# Patient Record
Sex: FEMALE | Race: WHITE
[De-identification: names, ages, dates, MRNs, and addresses within clinical notes are randomized per-mention and may not be internally consistent; named-entity substitution may affect disease eponyms.]

---

## 2017-10-14 ENCOUNTER — HOSPITAL ENCOUNTER (EMERGENCY)
Dept: HOSPITAL 31 - C.ER | Age: 67
Discharge: HOME | End: 2017-10-14
Payer: MEDICARE

## 2017-10-14 VITALS — BODY MASS INDEX: 37.7 KG/M2

## 2017-10-14 VITALS — TEMPERATURE: 98.2 F | OXYGEN SATURATION: 99 %

## 2017-10-14 VITALS — RESPIRATION RATE: 20 BRPM | HEART RATE: 63 BPM | DIASTOLIC BLOOD PRESSURE: 75 MMHG | SYSTOLIC BLOOD PRESSURE: 135 MMHG

## 2017-10-14 DIAGNOSIS — J40: Primary | ICD-10-CM

## 2017-10-14 LAB
ALBUMIN/GLOB SERPL: 0.9 {RATIO} (ref 1–2.1)
ALP SERPL-CCNC: 123 U/L (ref 38–126)
ALT SERPL-CCNC: 41 U/L (ref 9–52)
AST SERPL-CCNC: 24 U/L (ref 14–36)
BASOPHILS # BLD AUTO: 0.1 K/UL (ref 0–0.2)
BASOPHILS NFR BLD: 0.7 % (ref 0–2)
BILIRUB SERPL-MCNC: 0.4 MG/DL (ref 0.2–1.3)
BUN SERPL-MCNC: 18 MG/DL (ref 7–17)
CALCIUM SERPL-MCNC: 10 MG/DL (ref 8.6–10.4)
CHLORIDE SERPL-SCNC: 102 MMOL/L (ref 98–107)
CO2 SERPL-SCNC: 26 MMOL/L (ref 22–30)
EOSINOPHIL # BLD AUTO: 0.6 K/UL (ref 0–0.7)
EOSINOPHIL NFR BLD: 5.8 % (ref 0–4)
ERYTHROCYTE [DISTWIDTH] IN BLOOD BY AUTOMATED COUNT: 14.8 % (ref 11.5–14.5)
GLOBULIN SER-MCNC: 4.5 GM/DL (ref 2.2–3.9)
GLUCOSE SERPL-MCNC: 108 MG/DL (ref 65–105)
HCT VFR BLD CALC: 41.6 % (ref 34–47)
LYMPHOCYTES # BLD AUTO: 4.9 K/UL (ref 1–4.3)
LYMPHOCYTES NFR BLD AUTO: 45 % (ref 20–40)
MCH RBC QN AUTO: 25.6 PG (ref 27–31)
MCHC RBC AUTO-ENTMCNC: 32.3 G/DL (ref 33–37)
MCV RBC AUTO: 79.1 FL (ref 81–99)
MONOCYTES # BLD: 0.8 K/UL (ref 0–0.8)
MONOCYTES NFR BLD: 7.7 % (ref 0–10)
NRBC BLD AUTO-RTO: 0 % (ref 0–2)
PLATELET # BLD: 297 K/UL (ref 130–400)
PMV BLD AUTO: 9 FL (ref 7.2–11.7)
POTASSIUM SERPL-SCNC: 3.8 MMOL/L (ref 3.6–5.2)
PROT SERPL-MCNC: 8.7 G/DL (ref 6.3–8.3)
SODIUM SERPL-SCNC: 139 MMOL/L (ref 132–148)
WBC # BLD AUTO: 10.9 K/UL (ref 4.8–10.8)

## 2017-10-14 PROCEDURE — 83880 ASSAY OF NATRIURETIC PEPTIDE: CPT

## 2017-10-14 PROCEDURE — 84484 ASSAY OF TROPONIN QUANT: CPT

## 2017-10-14 PROCEDURE — 80053 COMPREHEN METABOLIC PANEL: CPT

## 2017-10-14 PROCEDURE — 71020: CPT

## 2017-10-14 PROCEDURE — 94150 VITAL CAPACITY TEST: CPT

## 2017-10-14 PROCEDURE — 96360 HYDRATION IV INFUSION INIT: CPT

## 2017-10-14 PROCEDURE — 94640 AIRWAY INHALATION TREATMENT: CPT

## 2017-10-14 PROCEDURE — 99284 EMERGENCY DEPT VISIT MOD MDM: CPT

## 2017-10-14 PROCEDURE — 93005 ELECTROCARDIOGRAM TRACING: CPT

## 2017-10-14 PROCEDURE — 85025 COMPLETE CBC W/AUTO DIFF WBC: CPT

## 2017-10-14 NOTE — C.PDOC
History Of Present Illness


67 year old female presents to the ED for evaluation of shortness of breath. 

Patient notes that she had a cold 4-5 weeks ago. Today began having shortness 

of breath, both at rest and with exertion. She endorses chest tightness, 

congestion, and wheezing. She denies fever or cough. No history of asthma or 

COPD.


Time Seen by Provider: 10/14/17 15:11


Chief Complaint (Nursing): Shortness Of Breath


History Per: Patient


History/Exam Limitations: no limitations


Current Symptoms Are (Timing): Still Present


Associated Symptoms: denies: Fever, Cough





Past Medical History


Reviewed: Historical Data, Nursing Documentation, Vital Signs


Vital Signs: 


 Last Vital Signs











Temp  98.2 F   10/14/17 15:05


 


Pulse  63   10/14/17 17:56


 


Resp  20   10/14/17 17:56


 


BP  135/75   10/14/17 17:56


 


Pulse Ox  99   10/14/17 18:01














- Medical History


PMH: Arthritis, HTN, Hypercholesterolemia


   Denies: Asthma


Family History: States: Unknown Family Hx





- Social History


Hx Tobacco Use: No


Hx Alcohol Use: No


Hx Substance Use: No





- Immunization History


Hx Tetanus Toxoid Vaccination: No


Hx Influenza Vaccination: Yes


Hx Pneumococcal Vaccination: No





Physical Exam





- Physical Exam


Appears: Non-toxic, No Acute Distress


Skin: Normal Color, Warm, Dry


Head: Atraumatic, Normacephalic


Eye(s): bilateral: Normal Inspection, PERRL, EOMI


Oral Mucosa: Moist


Respiratory: Normal Breath Sounds, No Rales, No Rhonchi, No Wheezing, Other (

Speaking in full sentences)


Extremity: Normal ROM, No Pedal Edema, No Deformity


Neurological/Psych: Oriented x3, Normal Speech





ED Course And Treatment





- Laboratory Results


Result Diagrams: 


 10/14/17 17:11





 10/14/17 17:11


ECG: Interpreted By Me, Viewed By Me


ECG Rhythm: Sinus Rhythm


Interpretation Of ECG: T-wave inversions in leads I, aVL, V2, and V6, EKG 

unchanged from prior on 2016


Rate From EC


O2 Sat by Pulse Oximetry: 99 (on room air)





- Radiology


CXR: Interpreted by Me, Viewed By Me, Read By Radiologist


CXR Interpretation: Yes: No Acute Disease





Progress





- Re-Evaluation


Re-evaluation Note: 





10/14/17 16:23


SP NEB . CO MILD IMPROVE. PERSIST SOB. VSS LABS PENDING


10/14/17 18:00


NARD VSS FEELS BETTER





- Data Reviewed


Data Reviewed: Lab, Diagnostic imaging, EKG, Old records





Disposition


Counseled Patient/Family Regarding: Studies Performed, Diagnosis, Need For 

Followup, Rx Given





- Disposition


Referrals: 


[facility] [Outside]


[facility] [Outside]


Disposition: HOME/ ROUTINE


Disposition Time: 18:00


Condition: IMPROVED


Prescriptions: 


Albuterol HFA [Ventolin HFA 90 mcg/actuation (8 g)] 1 puff IH Q4 #1 inhaler


Benzonatate [Tessalon Perles] 200 mg PO TID PRN #15 sgl


 PRN Reason: Cough


Instructions:  Acute Bronchitis (ED)


Forms:  [facility] (English)





- Clinical Impression


Clinical Impression: 


 Bronchitis








- Scribe Statement


The provider has reviewed the documentation as recorded by the Lorrie Alexis


Provider Attestation: 


All medical record entries made by the Joseibe were at my direction and 

personally dictated by me. I have reviewed the chart and agree that the record 

accurately reflects my personal performance of the history, physical exam, 

medical decision making, and the department course for this patient. I have 

also personally directed, reviewed, and agree with the discharge instructions 

and disposition.

## 2017-10-14 NOTE — RAD
HISTORY:

COUGH  



COMPARISON:

Comparison is made to 02/13/2016



TECHNIQUE:

Chest PA and lateral



FINDINGS:



LUNGS:

Questionable small infiltrate at the left lower lobe/retrocardiac 

region. Otherwise no evidence of new infiltrate or consolidation in 

the lungs.



PLEURA:

No significant pleural effusion identified. No pneumothorax apparent.



CARDIOVASCULAR:

Normal.



OSSEOUS STRUCTURES:

No significant abnormalities.



VISUALIZED UPPER ABDOMEN:

Normal.



OTHER FINDINGS:

None.



IMPRESSION:

Questionable small infiltrate at the left lower lobe.  Otherwise no 

interval change

## 2017-10-16 NOTE — CARD
--------------- APPROVED REPORT --------------





EKG Measurement

Heart [id number]

OK [id number]

[id number]

[id number]

[id number]



<Conclusion>

Normal sinus rhythm

Cannot rule out Anterior infarct, age undetermined

Abnormal ECG

## 2017-12-03 ENCOUNTER — HOSPITAL ENCOUNTER (INPATIENT)
Dept: HOSPITAL 42 - ED | Age: 67
LOS: 3 days | Discharge: HOME | DRG: 203 | End: 2017-12-06
Attending: INTERNAL MEDICINE | Admitting: INTERNAL MEDICINE
Payer: MEDICARE

## 2017-12-03 VITALS — BODY MASS INDEX: 29.2 KG/M2

## 2017-12-03 DIAGNOSIS — J45.909: ICD-10-CM

## 2017-12-03 DIAGNOSIS — J20.9: Primary | ICD-10-CM

## 2017-12-03 DIAGNOSIS — Z79.899: ICD-10-CM

## 2017-12-03 DIAGNOSIS — J04.0: ICD-10-CM

## 2017-12-03 DIAGNOSIS — R73.9: ICD-10-CM

## 2017-12-03 DIAGNOSIS — R40.2412: ICD-10-CM

## 2017-12-03 DIAGNOSIS — E78.00: ICD-10-CM

## 2017-12-03 DIAGNOSIS — G47.30: ICD-10-CM

## 2017-12-03 DIAGNOSIS — K21.9: ICD-10-CM

## 2017-12-03 DIAGNOSIS — E66.9: ICD-10-CM

## 2017-12-03 DIAGNOSIS — E87.6: ICD-10-CM

## 2017-12-03 DIAGNOSIS — I10: ICD-10-CM

## 2017-12-03 LAB
ALBUMIN/GLOB SERPL: 1.1 {RATIO}
ALP SERPL-CCNC: 115 U/L
ALT SERPL-CCNC: 46 U/L
APPEARANCE UR: (no result)
APTT BLD: 32.7 SECONDS
AST SERPL-CCNC: 36 U/L
BACTERIA #/AREA URNS HPF: (no result) /[HPF]
BASOPHILS # BLD AUTO: 0.03 K/MM3
BASOPHILS NFR BLD: 0.4 %
BILIRUB SERPL-MCNC: 0.7 MG/DL
BILIRUB UR-MCNC: NEGATIVE MG/DL
BUN SERPL-MCNC: 16 MG/DL
CALCIUM SERPL-MCNC: 9.5 MG/DL
CHLORIDE SERPL-SCNC: 105 MMOL/L
CO2 SERPL-SCNC: 23 MMOL/L
COLOR UR: YELLOW
EOSINOPHIL # BLD: 0 10*3/UL
EOSINOPHIL NFR BLD: 0.1 %
ERYTHROCYTE [DISTWIDTH] IN BLOOD BY AUTOMATED COUNT: 15.6 %
GLOBULIN SER-MCNC: 3.4 GM/DL
GLUCOSE SERPL-MCNC: 159 MG/DL
GLUCOSE UR STRIP-MCNC: NEGATIVE MG/DL
GRANULOCYTES # BLD: 5 10*3/UL
GRANULOCYTES NFR BLD: 58.9 %
HCT VFR BLD CALC: 41.1 %
INR PPP: 0.99
KETONES UR STRIP-MCNC: NEGATIVE MG/DL
LEUKOCYTE ESTERASE UR-ACNC: NEGATIVE LEU/UL
LYMPHOCYTES # BLD: 2.4 10*3/UL
LYMPHOCYTES NFR BLD AUTO: 28.7 %
MAGNESIUM SERPL-MCNC: 1.6 MG/DL
MCH RBC QN AUTO: 26.5 PG
MCHC RBC AUTO-ENTMCNC: 33.1 G/DL
MCV RBC AUTO: 80.1 FL
MONOCYTES # BLD AUTO: 1 10*3/UL
MONOCYTES NFR BLD: 11.9 %
PH UR STRIP: 7.5 [PH]
PLATELET # BLD: 285 10^3/UL
PMV BLD AUTO: 10.5 FL
POTASSIUM SERPL-SCNC: 3.4 MMOL/L
PROT SERPL-MCNC: 7.3 G/DL
PROT UR STRIP-MCNC: (no result) MG/DL
RBC # UR STRIP: (no result) /UL
RBC #/AREA URNS HPF: (no result) /HPF
SODIUM SERPL-SCNC: 140 MMOL/L
SP GR UR STRIP: 1.01
TROPONIN I SERPL-MCNC: < 0.01 NG/ML
UROBILINOGEN UR STRIP-ACNC: 0.2 E.U./DL
WBC # BLD AUTO: 8.5 10^3/UL
WBC #/AREA URNS HPF: NEGATIVE /HPF

## 2017-12-03 RX ADMIN — BUDESONIDE SCH MG: 0.25 SUSPENSION RESPIRATORY (INHALATION) at 19:29

## 2017-12-03 RX ADMIN — IPRATROPIUM BROMIDE AND ALBUTEROL SULFATE PRN ML: .5; 3 SOLUTION RESPIRATORY (INHALATION) at 19:30

## 2017-12-03 RX ADMIN — CEFTRIAXONE SCH MLS/HR: 1 INJECTION, SOLUTION INTRAVENOUS at 17:48

## 2017-12-03 NOTE — ED PDOC
Arrival/HPI





- General


Time Seen by Provider: 12/03/17 10:11


Historian: Patient





- History of Present Illness


Narrative History of Present Illness (Text): 





12/03/17 10:20


67 year old female, whose past medical history includes hypertension and asthma

, presents to the emergency department complaining of worsening shortness of 

breath associated with a cough. Patient reports she was recently discharged 

from [facility] after being admitted for 4 days. Patient was diagnosed 

with Asthma and discharged with Amoxicillin. Patient reports yellow phlegm 

after cough, and chest discomfort after periods of prolonged cough. Patient 

also reports a subjective fever, but denies any chills, nausea, vomiting, 

diarrhea, urinary symptoms, back pain, neck pain, headache, dizziness, or any 

other complaints. 





PMD: Dr. Gurrola





Time/Duration: Other (several weeks)


Symptom Onset: Gradual


Symptom Course: Worsening


Activities at Onset: Light


Context: Home





Past Medical History





- Provider Review


Nursing Documentation Reviewed: Yes





Family/Social History





- Physician Review


Nursing Documentation Reviewed: Yes


Family/Social History: No Known Family HX





Allergies/Home Meds


Allergies/Adverse Reactions: 


Allergies





No Known Allergies Allergy (Verified 12/03/17 10:21)


 








Home Medications: 


 Home Meds











 Medication  Instructions  Recorded  Confirmed


 


Albuterol HFA [Ventolin HFA 90 2 puff INH PRN PRN 12/03/17 12/03/17





mcg/actuation (8 g)]   


 


Amoxicillin/Clavulanate [Augmentin 1 tab PO Q12 12/03/17 12/03/17





250 MG-125 MG Tab]   


 


Lovastatin [Lovastatin] 1 tab PO DAILY 12/03/17 12/03/17


 


Prednisone 50 mg PO DAILY 12/03/17 12/03/17


 


amLODIPine [Norvasc] 10 mg PO DAILY 12/03/17 12/03/17














Review of Systems





- Physician Review


All systems were reviewed & negative as marked: Yes





- Review of Systems


Constitutional: Fevers.  absent: Other (Chills)


Respiratory: SOB, Cough, Sputum


Gastrointestinal: absent: Diarrhea, Nausea, Vomiting


Musculoskeletal: absent: Back Pain, Neck Pain


Neurological: absent: Headache, Dizziness





Physical Exam


Vital Signs Reviewed: Yes


Vital Signs











  Temp Pulse Resp BP Pulse Ox


 


 12/03/17 10:11  97.8 F  84  15  146/78  95











Temperature: Afebrile


Blood Pressure: Normal


Pulse: Regular


Respiratory Rate: Normal


Appearance: Positive for: Well-Appearing, Non-Toxic, Comfortable


Pain Distress: None


Mental Status: Positive for: Alert and Oriented X 3





- Systems Exam


Head: Present: Atraumatic, Normocephalic


Pupils: Present: PERRL


Extroacular Muscles: Present: EOMI


Conjunctiva: Present: Normal


Mouth: Present: Moist Mucous Membranes


Neck: Present: Normal Range of Motion


Respiratory/Chest: Present: Clear to Auscultation, Wheezes (Bilateral mild 

wheezing ).  No: Respiratory Distress, Accessory Muscle Use


Cardiovascular: Present: Regular Rate and Rhythm, Normal S1, S2.  No: Murmurs


Abdomen: Present: Normal Bowel Sounds.  No: Tenderness, Distention, Peritoneal 

Signs


Back: Present: Normal Inspection


Upper Extremity: Present: Normal Inspection.  No: Cyanosis, Edema


Lower Extremity: Present: Normal Inspection.  No: Edema


Neurological: Present: GCS=15, CN II-XII Intact, Speech Normal


Skin: Present: Warm, Dry, Normal Color.  No: Rashes


Psychiatric: Present: Alert, Oriented x 3, Normal Insight, Normal Concentration





Medical Decision Making


ED Course and Treatment: 





12/03/17 10:20


Impression:


67 year old female presents with worsening shortness of breath associated with 

chest discomfort after periods of prolonged cough. Patient was recently 

discharged from [facility] with diagnosis of Asthma





Plan:


-- EKG 


-- Labs


-- Duoneb 


-- SOLU-Medrol


-- Urinalysis 


-- Influenza A B Stat


-- Reassess and disposition





Progress Notes:


EKG shows NSR at 80 BPM with No ST/T changes. Interpreted by me. 





PROCEDURE: Chest X-ray 


Dictator : Kelly Casas MD


Report Date : 12/03/2017 11:04:25


IMPRESSION:


No focal consolidation, significant pleural effusion, or definite pneumothorax 

identified.





12/03/17 15:45


persistent wheezing, accepted by dr hercules. 





- Lab Interpretations


Lab Results: 








 12/03/17 10:30 





 12/03/17 10:30 





 Lab Results





12/03/17 11:40: Urine Color Yellow, Urine Appearance Sl cloudy, Urine pH 7.5, 

Ur Specific Gravity 1.015, Urine Protein Trace H, Urine Glucose (UA) Negative, 

Urine Ketones Negative, Urine Blood Small H, Urine Nitrate Negative, Urine 

Bilirubin Negative, Urine Urobilinogen 0.2, Ur Leukocyte Esterase Negative, 

Urine RBC 0 - 2, Urine WBC Negative, Ur Epithelial Cells 1 - 3, Urine Bacteria 

Trace


12/03/17 10:30: Influenza Typ A,B (EIA) Negative for flu a/b


12/03/17 10:30: Sodium 140, Potassium 3.4 L, Chloride 105, Carbon Dioxide 23, 

Anion Gap 14, BUN 16, Creatinine 0.9, Est GFR (African Amer) > 60, Est GFR (Non-

Af Amer) > 60, Random Glucose 159 H, Calcium 9.5, Magnesium 1.6 L, Total 

Bilirubin 0.7, AST 36, ALT 46, Alkaline Phosphatase 115, Lactate Dehydrogenase 

527, Total Creatine Kinase 73, Troponin I < 0.01, NT-Pro-B Natriuret Pep 185, 

Total Protein 7.3, Albumin 3.9, Globulin 3.4, Albumin/Globulin Ratio 1.1


12/03/17 10:30: PT 10.9, INR 0.99, APTT 32.7


12/03/17 10:30: WBC 8.5, RBC 5.13, Hgb 13.6, Hct 41.1, MCV 80.1, MCH 26.5, MCHC 

33.1, RDW 15.6 H, Plt Count 285, MPV 10.5, Gran % 58.9, Lymph % (Auto) 28.7, 

Mono % (Auto) 11.9 H, Eos % (Auto) 0.1 L, Baso % (Auto) 0.4, Gran # 5.00, Lymph 

# 2.4, Mono # 1.0 H, Eos # 0.0, Baso # 0.03








I have reviewed the lab results: Yes





- RAD Interpretation


Radiology Orders: 








12/03/17 10:20


CHEST PORTABLE [RAD] Stat 














- EKG Interpretation


Interpreted by ED Physician: Yes


Type: 12 lead EKG





- Medication Orders


Current Medication Orders: 








Albuterol/Ipratropium (Duoneb 3 Mg/0.5 Mg (3 Ml) Ud)  3 ml IH [id number] PRN


   PRN Reason: Shortness of Breath


Famotidine (Pepcid)  20 mg PO 1000,2200 SLOAN


Ceftriaxone Sodium (Rocephin 1 Gram Ivpb (D5w))  1 gm in 100 mls @ 200 mls/hr 

IVPB DAILY SLOAN


   PRN Reason: Protocol


Methylprednisolone (Solu-Medrol)  40 mg IVP TID SLOAN





Discontinued Medications





Albuterol/Ipratropium (Duoneb 3 Mg/0.5 Mg (3 Ml) Ud)  3 ml IH STAT STA


   Stop: 12/03/17 10:21


   Last Admin: 12/03/17 10:47  Dose: 3 ml





Albuterol/Ipratropium (Duoneb 3 Mg/0.5 Mg (3 Ml) Ud)  3 ml IH STAT STA


   Stop: 12/03/17 11:55


   Last Admin: 12/03/17 12:46  Dose: 3 ml





Albuterol/Ipratropium (Duoneb 3 Mg/0.5 Mg (3 Ml) Ud)  3 ml IH STAT STA


   Stop: 12/03/17 12:01


   Last Admin: 12/03/17 12:14  Dose: 3 ml





Methylprednisolone (Solu-Medrol)  125 mg IVP STAT STA


   Stop: 12/03/17 10:21


   Last Admin: 12/03/17 10:47  Dose: 125 mg





IVP Administration


 Document     12/03/17 10:47  [facility]  (Rec: 12/03/17 10:47  C  [id number])


     Charges for Administration


      # of IVP Administrations                   1














- Scribe Statement


The provider has reviewed the documentation as recorded by the Roz Pelletier





Provider Scribe Attestation:


All medical record entries made by the Souravibzena were at my direction and 

personally dictated by me. I have reviewed the chart and agree that the record 

accurately reflects my personal performance of the history, physical exam, 

medical decision making, and the department course for this patient. I have 

also personally directed, reviewed, and agree with the discharge instructions 

and disposition.








Disposition/Present on Arrival





- Present on Arrival


Any Indicators Present on Arrival: No





- Disposition


Have Diagnosis and Disposition been Completed?: Yes


Diagnosis: 


 Asthma





Disposition: HOSPITALIZED


Disposition Time: 01:00


Condition: STABLE

## 2017-12-03 NOTE — CARD
--------------- APPROVED REPORT --------------





EKG Measurement

Heart [id number]

VT [id number]

[id number]

[id number]

[id number]



<Conclusion>

Normal sinus rhythm

Nonspecific T wave abnormality

Abnormal ECG

## 2017-12-03 NOTE — RAD
HISTORY:

cough  



COMPARISON:

None available. 



TECHNIQUE:

Chest, one view.



FINDINGS:

Examination limited by habitus.



LUNGS:

No focal consolidation.



Please note that chest x-ray has limited sensitivity for the 

detection of pulmonary masses.



PLEURA:

No significant pleural effusion identified. No definite pneumothorax .



CARDIOVASCULAR:

Heart size appears top normal.



OSSEOUS STRUCTURES:

 Degenerative changes of the spine.



VISUALIZED UPPER ABDOMEN:

Unremarkable.



OTHER FINDINGS:

None.



IMPRESSION:

No focal consolidation, significant pleural effusion, or definite 

pneumothorax identified.

## 2017-12-04 LAB
BUN SERPL-MCNC: 17 MG/DL
CALCIUM SERPL-MCNC: 9.8 MG/DL
CHLORIDE SERPL-SCNC: 106 MMOL/L
CHOLEST SERPL-MCNC: 217 MG/DL
CO2 SERPL-SCNC: 22 MMOL/L
ERYTHROCYTE [DISTWIDTH] IN BLOOD BY AUTOMATED COUNT: 15.9 %
GLUCOSE SERPL-MCNC: 240 MG/DL
HCT VFR BLD CALC: 39.5 %
MCH RBC QN AUTO: 25.9 PG
MCHC RBC AUTO-ENTMCNC: 32.7 G/DL
MCV RBC AUTO: 79.2 FL
PLATELET # BLD: 295 10^3/UL
PMV BLD AUTO: 10.6 FL
POTASSIUM SERPL-SCNC: 4.4 MMOL/L
SODIUM SERPL-SCNC: 139 MMOL/L
WBC # BLD AUTO: 6.9 10^3/UL

## 2017-12-04 RX ADMIN — IPRATROPIUM BROMIDE AND ALBUTEROL SULFATE PRN ML: .5; 3 SOLUTION RESPIRATORY (INHALATION) at 07:15

## 2017-12-04 RX ADMIN — METHYLPREDNISOLONE SODIUM SUCCINATE SCH MG: 40 INJECTION, POWDER, FOR SOLUTION INTRAMUSCULAR; INTRAVENOUS at 10:16

## 2017-12-04 RX ADMIN — BUDESONIDE SCH MG: 0.25 SUSPENSION RESPIRATORY (INHALATION) at 21:06

## 2017-12-04 RX ADMIN — METOCLOPRAMIDE HYDROCHLORIDE SCH MG: 5 SOLUTION ORAL at 16:14

## 2017-12-04 RX ADMIN — PANTOPRAZOLE SODIUM SCH MG: 40 TABLET, DELAYED RELEASE ORAL at 16:14

## 2017-12-04 RX ADMIN — PANTOPRAZOLE SODIUM SCH MG: 40 TABLET, DELAYED RELEASE ORAL at 06:55

## 2017-12-04 RX ADMIN — CEFTRIAXONE SCH MLS/HR: 1 INJECTION, SOLUTION INTRAVENOUS at 10:16

## 2017-12-04 RX ADMIN — METHYLPREDNISOLONE SODIUM SUCCINATE SCH MG: 40 INJECTION, POWDER, FOR SOLUTION INTRAMUSCULAR; INTRAVENOUS at 13:43

## 2017-12-04 RX ADMIN — METOCLOPRAMIDE HYDROCHLORIDE SCH: 5 SOLUTION ORAL at 11:30

## 2017-12-04 RX ADMIN — METOCLOPRAMIDE HYDROCHLORIDE SCH MG: 5 SOLUTION ORAL at 21:47

## 2017-12-04 RX ADMIN — BUDESONIDE SCH MG: 0.25 SUSPENSION RESPIRATORY (INHALATION) at 07:14

## 2017-12-04 RX ADMIN — METHYLPREDNISOLONE SODIUM SUCCINATE SCH MG: 40 INJECTION, POWDER, FOR SOLUTION INTRAMUSCULAR; INTRAVENOUS at 17:32

## 2017-12-04 RX ADMIN — IPRATROPIUM BROMIDE AND ALBUTEROL SULFATE PRN ML: .5; 3 SOLUTION RESPIRATORY (INHALATION) at 13:24

## 2017-12-04 RX ADMIN — GUAIFENESIN PRN MG: 100 SOLUTION ORAL at 22:16

## 2017-12-04 RX ADMIN — GUAIFENESIN PRN MG: 100 SOLUTION ORAL at 16:17

## 2017-12-04 RX ADMIN — METOCLOPRAMIDE HYDROCHLORIDE SCH MG: 5 SOLUTION ORAL at 06:55

## 2017-12-04 NOTE — CON
DATE:  12/03/2017



REFERRING PHYSICIAN:  Dr. Lee



REASON FOR CONSULTATION:  Cough, shortness of breath, _____, she has

asthma.



HISTORY OF PRESENT ILLNESS:  This is a 67-year-old female with a history of

hypertension, recently was told she has asthma.  Apparently, had 2 visits

to [facility], had a CT scan of the chest done, was told it was

unremarkable, and she was sent home with some inhaler and also been on

amoxicillin without much benefit, now comes to emergency room at [facility] with cough, shortness of breath.  No chest pain.  No nausea,

no vomiting, diarrhea, leg pain, leg swelling.  Admits to have a severe

GERD.  Does not know if she snores, daytime sleepy and tired.



PAST MEDICAL HISTORY:  Hypertension, obesity, may have a sleep apnea

syndrome, may have a GERD.



ALLERGIES:  NONE KNOWN.



SOCIAL HISTORY:  Nonsmoker, nondrinker.



FAMILY HISTORY:  No significant cardiopulmonary disease reported.



MEDICATIONS:  She is on DuoNeb q. 6 hours, Pepcid 20 mg twice a day,

Pulmicort inhaled twice a day, Rocephin 1 g IV daily, Solu-Medrol 40 mg

three times a day, Tylenol p.r.n. basis.



REVIEW OF SYSTEMS:  No headache or rhinitis.  Has a cough, shortness of

breath, sputum production and severe history of GERD.  No chest pain.  No

abdominal pain.  No dysuria.  No leg pain or leg swelling.  Does not know

if she snores, sleepy during the daytime.



PHYSICAL EXAMINATION:

GENERAL:  Lying in the bed, mild distress secondary to cough and shortness

of breath.

VITAL SIGNS:  Temperature is 98, heart is 101, respiratory rate is 18,

blood pressure 121/74, pulse of 98% on 2 liters nasal cannula.

HEENT:  Moist mucous membrane.  Crowded airway.  Mallampati score is 4.

NECK:  Supple.  No JVD.

LUNGS:  Has a scattered rhonchi, not much wheezing though.

HEART:  S1 and S2.

ABDOMEN:  Soft, nontender.  No organomegaly.

EXTREMITIES:  No edema.

NEUROLOGIC:  Awake, alert, follows simple commands.



LABORATORY DATA:  Shows hemoglobin 13.6, hematocrit 41.1, WBC 8.5, platelet

count is 285.  INR 0.99, PTT 37.  Sodium 140, potassium 3.4, chloride 105,

bicarbonate 23, BUN 16, creatinine 0.9, glucose 159, calcium 9.5, magnesium

1.6, total bili 0.7, AST 36, ALT 46, alk phos is 115.  LDH is 527. 

Troponin is less than 0.01.  ProBNP 185.  Albumin is 3.9.  Influenza A and

B been negative.  Chest x-ray done in the ER shows no infiltrate or

effusion.



IMPRESSION AND PLAN:  Recurrent cough and shortness of breath, may have

acute bronchitis.  Clinically, I feel she may have severe gastroesophageal

reflux disease with component of sleep apnea with worsening

gastroesophageal reflux disease causing her recurrent cough.  According to

the patient, a CAT scan has been negative at [facility], so I will

decrease Solu-Medrol, change Pepcid to Protonix twice a day.  We will add

Reglan 10 mg four times a day for a day or two, keep head elevated 45

degrees.  Gastric prophylaxis, DVT prophylaxis, will benefit from

outpatient sleep study to assure there is intrinsic lung disease and also

will benefit from attended sleep study to assure there is no sleep apnea

syndrome.  Case discussed with Dr. Lee, spoke to family at bedside.  All

the questions answered.



Thank you and we will follow with you.







__________________________________________

Danae Muller MD



DD:  12/03/2017 23:02:10

DT:  12/03/2017 23:04:40

Job # [id number]

## 2017-12-04 NOTE — CP.PCM.PN
<Deena Ohara - Last Filed: 12/05/17 01:13>





Subjective





- Date & Time of Evaluation


Date of Evaluation: 12/04/17


Time of Evaluation: 10:30





- Subjective


Subjective: 


67 yr [place] female w/ asthma, HTN, GERD. She is laying in bed comfortably c/

o increasing cough, nonproductive. Dyspepsia also increased. She denies any 

headaches, vision changes, dizziness/lightheadedness, numbness/tingling, SOB, 

chest pain, diarrhea, constipation or urinary frequency. No distress noted.











Objective





- Vital Signs/Intake and Output


Vital Signs (last 24 hours): 


 











Temp Pulse Resp BP Pulse Ox


 


 98 F   66   20   118/73   98 


 


 12/04/17 06:00  12/04/17 06:00  12/04/17 06:00  12/04/17 06:00  12/03/17 16:09








Intake and Output: 


 











 12/04/17 12/04/17





 06:59 18:59


 


Intake Total 240 1080


 


Balance 240 1080














- Medications


Medications: 


 Current Medications





Acetaminophen (Tylenol 325mg Tab)  650 mg PO Q6H PRN


   PRN Reason: Pain, moderate (4-7)


   Last Admin: 12/04/17 06:55 Dose:  650 mg


Albuterol/Ipratropium (Duoneb 3 Mg/0.5 Mg (3 Ml) Ud)  3 ml IH [id number] PRN


   PRN Reason: Shortness of Breath


   Last Admin: 12/04/17 13:24 Dose:  3 ml


Budesonide (Pulmicort Respules)  0.25 mg IH [id number] [facility]


   Last Admin: 12/04/17 07:14 Dose:  0.25 mg


Famotidine (Pepcid)  40 mg PO DAILY PRN


   PRN Reason: Dyspepsia


Guaifenesin (Robitussin)  100 mg PO Q4H PRN


   PRN Reason: Cough


   Last Admin: 12/04/17 16:17 Dose:  100 mg


Ceftriaxone Sodium (Rocephin 1 Gram Ivpb (D5w))  1 gm in 100 mls @ 200 mls/hr 

IVPB DAILY [facility]


   PRN Reason: Protocol


   Last Admin: 12/04/17 10:16 Dose:  200 mls/hr


Methylprednisolone (Solu-Medrol)  20 mg IVP TID [facility]


   Last Admin: 12/04/17 17:32 Dose:  20 mg


Metoclopramide HCl (Reglan)  10 mg PO 0600,1130,1630,2200 [facility]


   Last Admin: 12/04/17 16:14 Dose:  10 mg


Pantoprazole Sodium (Protonix Ec Tab)  40 mg PO 0600,1600 [facility]


   Last Admin: 12/04/17 16:14 Dose:  40 mg


Pregabalin (Lyrica)  25 mg PO BID [facility]


   Last Admin: 12/04/17 17:31 Dose:  25 mg











- Labs


Labs: 


 





 12/04/17 06:30 





 12/04/17 06:30 





 











PT  10.9 SECONDS (9.4-12.5)   12/03/17  10:30    


 


INR  0.99  (0.93-1.08)   12/03/17  10:30    


 


APTT  32.7 Seconds (25.1-36.5)   12/03/17  10:30    














- Constitutional


Appears: Well





- Head Exam


Head Exam: ATRAUMATIC, NORMAL INSPECTION, NORMOCEPHALIC





- Eye Exam


Eye Exam: EOMI, Normal appearance, PERRL


Pupil Exam: NORMAL ACCOMODATION, PERRL





- ENT Exam


ENT Exam: Mucous Membranes Moist, Normal Exam





- Neck Exam


Neck Exam: Full ROM, Normal Inspection.  absent: Lymphadenopathy





- Respiratory Exam


Respiratory Exam: Decreased Breath Sounds, Clear to Ausculation Bilateral, 

NORMAL BREATHING PATTERN





- Cardiovascular Exam


Cardiovascular Exam: REGULAR RHYTHM, +S1, +S2.  absent: Murmur





- GI/Abdominal Exam


GI & Abdominal Exam: Soft, Normal Bowel Sounds.  absent: Tenderness





-  Exam


 Exam: NORMAL INSPECTION





- Extremities Exam


Extremities Exam: Full ROM, Normal Capillary Refill, Normal Inspection.  absent

: Joint Swelling, Pedal Edema





- Back Exam


Back Exam: NORMAL INSPECTION





- Neurological Exam


Neurological Exam: Alert, Awake, CN II-XII Intact, Normal Gait, Oriented x3





- Psychiatric Exam


Psychiatric exam: Normal Affect, Normal Mood





- Skin


Skin Exam: Dry, Intact, Normal Color, Warm





Assessment and Plan


(1) Acute bronchitis


Status: Acute   





(2) GERD (gastroesophageal reflux disease)


Status: Acute   





(3) Hypokalemia


Status: Acute   





(4) Hyperglycemia


Status: Acute   





(5) Hypercholesteremia


Status: Acute   





(6) Asthma


Status: Acute   





- Assessment and Plan (Free Text)


Plan: 


Supplemented K. Added Robitussin for cough. Added Pepcid PRN for dyspepsia. 





Consults:


Pulmonary = Dr. Muller = acute bronchitis, severe GERD, outpt sleep study, 

decrease solumedryl, protonix, reglan, HOB 45 





Reviewed:


ECG = (+) NSR, nonspecific T wave abnormality, abn.


CXR = (-) WNL








<Mayra Lee - Last Filed: 12/05/17 11:41>





Objective





- Vital Signs/Intake and Output


Vital Signs (last 24 hours): 


 











Temp Pulse Resp BP Pulse Ox


 


 97.5 F L  66   18   152/90 H  98 


 


 12/05/17 07:30  12/05/17 07:30  12/05/17 07:30  12/05/17 07:30  12/05/17 07:30








Intake and Output: 


 











 12/05/17 12/05/17





 06:59 18:59


 


Intake Total  240


 


Balance  240














- Medications


Medications: 


 Current Medications





Acetaminophen (Tylenol 325mg Tab)  650 mg PO Q6H PRN


   PRN Reason: Pain, moderate (4-7)


   Last Admin: 12/05/17 08:44 Dose:  650 mg


Albuterol/Ipratropium (Duoneb 3 Mg/0.5 Mg (3 Ml) Ud)  3 ml IH [id number] PRN


   PRN Reason: Shortness of Breath


   Last Admin: 12/05/17 07:22 Dose:  3 ml


Budesonide (Pulmicort Respules)  0.25 mg IH [id number] [facility]


   Last Admin: 12/04/17 21:06 Dose:  0.25 mg


Famotidine (Pepcid)  40 mg PO DAILY PRN


   PRN Reason: Dyspepsia


Guaifenesin (Robitussin)  100 mg PO Q4H PRN


   PRN Reason: Cough


   Last Admin: 12/05/17 06:26 Dose:  100 mg


Ceftriaxone Sodium (Rocephin 1 Gram Ivpb (D5w))  1 gm in 100 mls @ 200 mls/hr 

IVPB DAILY [facility]


   PRN Reason: Protocol


   Last Admin: 12/05/17 09:17 Dose:  200 mls/hr


Methylprednisolone (Solu-Medrol)  20 mg IVP TID [facility]


   Last Admin: 12/05/17 09:17 Dose:  20 mg


Metoclopramide HCl (Reglan)  10 mg PO 0600,1130,1630,2200 [facility]


   Last Admin: 12/05/17 06:26 Dose:  10 mg


Pantoprazole Sodium (Protonix Ec Tab)  40 mg PO 0600,1600 [facility]


   Last Admin: 12/05/17 06:26 Dose:  40 mg


Pregabalin (Lyrica)  25 mg PO BID [facility]


   Last Admin: 12/05/17 09:17 Dose:  25 mg











- Labs


Labs: 


 





 12/04/17 06:30 





 12/04/17 06:30 





 











PT  10.9 SECONDS (9.4-12.5)   12/03/17  10:30    


 


INR  0.99  (0.93-1.08)   12/03/17  10:30    


 


APTT  32.7 Seconds (25.1-36.5)   12/03/17  10:30    














Assessment and Plan





- Assessment and Plan (Free Text)


Plan: 





pt is seen and examined at bed side . looking comfortable . less coughing , sob 

. no chest pain . abdomenal pain , feeling gerd dyspepsea , agreed all above

## 2017-12-04 NOTE — HP
CHIEF COMPLAINT:  Coughing and shortness of breath.



HISTORY OF PRESENT ILLNESS:  Ms. Rafal Chavis is a 67-year-old female with

past medical history of hypertension, asthma, came to the emergency room

complaining of worsening of shortness of breath, associated cough.  The

patient reports that she was recently discharged from [facility] after

being admitted for four days.  The patient was diagnosed with asthma and

discharged with muscle pain.  The patient  reports yellow phlegm after

coming and chest discomfort after periods of prolonged cough.  The patient

also reported subjective fever, but denies any chills, nausea, vomiting,

diarrhea, urinary symptoms, back pain, neck pain, headache, or dizziness.



PAST MEDICAL HISTORY:  As above; history of asthma, hypertension, obesity.



FAMILY HISTORY:  Father and mother noncontributory.



ALLERGIES:  THE PATIENT IS NOT ALLERGIC TO ANY MEDICATION.



HOME MEDICATIONS:  Albuterol, amoxicillin/clavulanate (Augmentin),

lovastatin, prednisone, amlodipine.



REVIEW OF SYSTEMS:  The patient is examined at the bedside in the room,

still complaining about fever and chills, shortness of breath, sputum.  No

dysuria.  No nausea or vomiting.  No back pain or neck pain.  No headache. 

No dizziness.  The patient's daughter was sitting on the bedside, me and

Dr. Muller examined the patient together.



PHYSICAL EXAMINATION:

VITAL SIGNS:  Temperature 97.8, pulse 64, respiratory rate 15, blood

pressure 146/78, pulse oximetry 95.

HEENT:  Head is normocephalic and atraumatic.  Eyes:  PERRLA.  Extraocular

movements intact.  Conjunctivae clear.  Nose patent.  Mucous membranes

moist.

NECK:  Supple.  No carotid bruits, JVD, or thyromegaly.

CHEST:  Bilaterally symmetrical.

HEART:  S1 and S2 positive.

LUNGS:  Clear to auscultation.

ABDOMEN:  Soft.  Bowel sounds present.  No organomegaly.

EXTREMITIES:  No edema.  No cyanosis.

NEUROLOGIC:  The patient is awake and alert.  Moving all four extremities. 

No focal deficits.



LABORATORY DATA:  White blood cells 8.5, hemoglobin 13.6, hematocrit 41.1,

platelets 286.  Sodium 140, potassium 3.4, BUN 16, creatinine 0.9, glucose

159.



ASSESSMENT AND PLAN:  Ms. Rafal Chavis is a 67-year-old female with

hypokalemia, hyperglycemia, hypertension, hypercholesterolemia, history of

asthma, gastroesophageal reflux disease, dyspepsia, rule out obstructive

sleep apnea syndrome, came with exacerbation of asthma.  We admitted the

patient, started on albuterol, potassium was too low,  we replaced that;

started on Lyrica, Protonix for gastrointestinal prophylaxis, and gave

Pulmicort, Reglan is given by Dr. Muller, antibiotics started, tapering

doses of steroid.  Repeat labs.  Gastrointestinal and deep venous

thrombosis prophylaxis.  We will follow up.





__________________________________________

Mayra Lee MD





DD:  12/03/2017 23:38:06

DT:  12/04/2017 2:23:52

Job # [id number]

## 2017-12-04 NOTE — PN
PULMONARY PROGRESS NOTE



DATE:  12/04/2017



REFERRING PHYSICIAN:  Dr. Lee.



SUBJECTIVE:  She is sitting up in a bed, having dinner, feels much better

compared to yesterday, still have cough and shortness of breath.  No

nausea.  No vomiting.  No diarrhea.  No leg pain or leg swelling.



PHYSICAL EXAMINATION

GENERAL:  No acute distress.

VITAL SIGNS:  Temperature 98, heart rate is 66, respiratory rate is 20,

blood pressure 118/73, and pulse ox 98% on 2 L nasal cannula.

HEENT:  Moist mucous membranes.  Crowded airway.

NECK:  Supple.  No JVD.

HEART:  S1 and S2.

LUNGS:  Few scattered rhonchi.

ABDOMEN:  Soft, nontender.  No organomegaly.

EXTREMITIES:  No edema.

NEUROLOGIC:  Awake and alert.  Follows simple commands.



LABORATORY DATA:  Hemoglobin 12.9, hematocrit 39.5, WBC 6.9, platelet count

is 295.  Sodium 139, potassium 4.4, chloride 106, bicarbonate 22, BUN 17,

creatinine 0.9, glucose 240, calcium 9.8, cholesterol is 217, TSH 0.10.



MEDICATIONS:  She is on DuoNeb q. 6 hours p.r.n., Lyrica 25 mg twice a day,

Pepcid 40 mg daily p.r.n., Protonix 40 mg twice a day, Pulmicort inhaled

twice a day, Reglan 10 mg four times a day, Robitussin 100 mg q. 4 hours,

Rocephin 1 g IV daily, Solu-Medrol 20 mg three times a day, Tylenol p.r.n.

basis.



IMPRESSION AND PLAN:  Recurrent cough and shortness of breath probably

related to severe gastroesophageal reflux disease triggering acute

bronchitis, _____ asthma, may have a component of sleep apnea syndrome.  We

will continue IV and inhaled bronchodilator.  Continue antibiotics. 

Continue Reglan.  Continue proton inhibitor.  Gastroesophageal reflux

disease precaution.  We will need PFT and sleep study upon discharge as an

outpatient.



Thank you and we will follow with you.





__________________________________________

Danae Muller MD



DD:  12/04/2017 18:39:48

DT:  12/04/2017 19:18:25

Job # [id number]

## 2017-12-05 VITALS — RESPIRATION RATE: 20 BRPM

## 2017-12-05 RX ADMIN — METOCLOPRAMIDE HYDROCHLORIDE SCH: 5 SOLUTION ORAL at 13:20

## 2017-12-05 RX ADMIN — GUAIFENESIN PRN MG: 100 SOLUTION ORAL at 21:48

## 2017-12-05 RX ADMIN — METHYLPREDNISOLONE SODIUM SUCCINATE SCH MG: 40 INJECTION, POWDER, FOR SOLUTION INTRAMUSCULAR; INTRAVENOUS at 21:48

## 2017-12-05 RX ADMIN — PANTOPRAZOLE SODIUM SCH MG: 40 TABLET, DELAYED RELEASE ORAL at 06:26

## 2017-12-05 RX ADMIN — GUAIFENESIN PRN MG: 100 SOLUTION ORAL at 06:26

## 2017-12-05 RX ADMIN — BUDESONIDE SCH MG: 0.25 SUSPENSION RESPIRATORY (INHALATION) at 20:32

## 2017-12-05 RX ADMIN — PANTOPRAZOLE SODIUM SCH MG: 40 TABLET, DELAYED RELEASE ORAL at 16:22

## 2017-12-05 RX ADMIN — CEFTRIAXONE SCH MLS/HR: 1 INJECTION, SOLUTION INTRAVENOUS at 09:17

## 2017-12-05 RX ADMIN — GUAIFENESIN PRN MG: 100 SOLUTION ORAL at 12:49

## 2017-12-05 RX ADMIN — METOCLOPRAMIDE HYDROCHLORIDE SCH MG: 5 SOLUTION ORAL at 16:22

## 2017-12-05 RX ADMIN — METOCLOPRAMIDE HYDROCHLORIDE SCH MG: 5 SOLUTION ORAL at 06:26

## 2017-12-05 RX ADMIN — IPRATROPIUM BROMIDE AND ALBUTEROL SULFATE PRN ML: .5; 3 SOLUTION RESPIRATORY (INHALATION) at 07:22

## 2017-12-05 RX ADMIN — METHYLPREDNISOLONE SODIUM SUCCINATE SCH MG: 40 INJECTION, POWDER, FOR SOLUTION INTRAMUSCULAR; INTRAVENOUS at 09:17

## 2017-12-05 RX ADMIN — METHYLPREDNISOLONE SODIUM SUCCINATE SCH MG: 40 INJECTION, POWDER, FOR SOLUTION INTRAMUSCULAR; INTRAVENOUS at 14:30

## 2017-12-06 VITALS
TEMPERATURE: 97.9 F | DIASTOLIC BLOOD PRESSURE: 87 MMHG | HEART RATE: 70 BPM | OXYGEN SATURATION: 97 % | SYSTOLIC BLOOD PRESSURE: 150 MMHG

## 2017-12-06 RX ADMIN — CEFTRIAXONE SCH MLS/HR: 1 INJECTION, SOLUTION INTRAVENOUS at 09:56

## 2017-12-06 RX ADMIN — IPRATROPIUM BROMIDE AND ALBUTEROL SULFATE PRN ML: .5; 3 SOLUTION RESPIRATORY (INHALATION) at 07:15

## 2017-12-06 RX ADMIN — BUDESONIDE SCH MG: 0.25 SUSPENSION RESPIRATORY (INHALATION) at 07:16

## 2017-12-06 RX ADMIN — GUAIFENESIN PRN MG: 100 SOLUTION ORAL at 09:47

## 2017-12-06 NOTE — PN
DATE:  12/05/2017



PULMONARY PROGRESS NOTE



REFERRING PHYSICIAN:  Mayra Lee MD



SUBJECTIVE:  She is sitting up in the bed, feels better, decreased cough,

decreased shortness of breath.  No nausea, no vomiting.  No diarrhea.  No

leg pain or leg swelling.



OBJECTIVE:

GENERAL:  In no acute distress.

VITAL SIGNS:  Temperature 98, heart rate is 67, respiratory rate is 18,

blood pressure 155/94, and pulse ox 95% on room air.

HEENT:  Moist mucous membranes.  Crowded airway.

NECK:  Supple.  No JVD.

LUNGS:  A few scattered rhonchi.

HEART:  S1, S2.

ABDOMEN:  Soft, nontender.  No organomegaly.

EXTREMITIES:  There is no edema.

NEUROLOGIC:  Awake and alert.  Follows simple commands.



MEDICATIONS:  She is on DuoNeb q. 6 hours p.r.n., Lyrica 25 mg twice a day,

Pepcid 40 mg daily p.r.n., Protonix 40 mg twice a day, Pulmicort inhaled

twice a day, Reglan 5 mg q.i.d., Robitussin 100 mg q. 4 hours, Rocephin 1 g

daily, Solu-Medrol 20 mg q.i.d., Tylenol p.r.n. basis.



LABORATORY DATA:  Reviewed and no new lab is available since yesterday.



IMPRESSION AND PLAN:  Recurrent cough with shortness of breath, probably

pharyngitis/laryngitis, rule out chronic lung disease, severe

gastroesophageal reflux disease, may have a component of sleep apnea

syndrome.  We will decrease Solu-Medrol to 20 mg daily, decrease Reglan to

5 q.i.d.  Continue antibiotics.  Inhale bronchodilator.  Continue Protonix.

Gastroesophageal reflux disease precaution.





__________________________________________

Danae Muller MD





DD:  12/05/2017 22:10:20

DT:  12/06/2017 1:39:04

Job # [id number]

## 2017-12-06 NOTE — CP.PCM.DIS
<Deena Ohara - Last Filed: 12/06/17 22:15>





Provider





- Provider


Date of Admission: 


12/03/17 12:21





Attending physician: 


Mayra Lee MD





Primary care physician: 


Vidal Gurrola MD





Consults: 





Pulmonary = Dr. Muller 


Time Spent in preparation of Discharge (in minutes): 40





Diagnosis





- Discharge Diagnosis


(1) Acute bronchitis


Status: Acute   





(2) GERD (gastroesophageal reflux disease)


Status: Acute   





(3) Hypokalemia


Status: Acute   





(4) Hyperglycemia


Status: Acute   





(5) Hypercholesteremia


Status: Acute   





(6) Asthma


Status: Acute   





Hospital Course





- Lab Results


Lab Results: 


 Most Recent Lab Values











WBC  6.9 10^3/ul (4.5-11.0)   12/04/17  06:30    


 


RBC  4.99 10^6/uL (3.5-6.1)   12/04/17  06:30    


 


Hgb  12.9 g/dL (12.0-16.0)   12/04/17  06:30    


 


Hct  39.5 % (36.0-48.0)   12/04/17  06:30    


 


MCV  79.2 fl (80.0-105.0)  L  12/04/17  06:30    


 


MCH  25.9 pg (25.0-35.0)   12/04/17  06:30    


 


MCHC  32.7 g/dl (31.0-37.0)   12/04/17  06:30    


 


RDW  15.9 % (11.5-14.5)  H  12/04/17  06:30    


 


Plt Count  295 10^3/uL (120.0-450.0)   12/04/17  06:30    


 


MPV  10.6 fl (7.0-11.0)   12/04/17  06:30    


 


Gran %  58.9 % (50.0-68.0)   12/03/17  10:30    


 


Lymph % (Auto)  28.7 % (22.0-35.0)   12/03/17  10:30    


 


Mono % (Auto)  11.9 % (1.0-6.0)  H  12/03/17  10:30    


 


Eos % (Auto)  0.1 % (1.5-5.0)  L  12/03/17  10:30    


 


Baso % (Auto)  0.4 % (0.0-3.0)   12/03/17  10:30    


 


Gran #  5.00  (1.4-6.5)   12/03/17  10:30    


 


Lymph #  2.4  (1.2-3.4)   12/03/17  10:30    


 


Mono #  1.0  (0.1-0.6)  H  12/03/17  10:30    


 


Eos #  0.0  (0.0-0.7)   12/03/17  10:30    


 


Baso #  0.03 K/mm3 (0.0-2.0)   12/03/17  10:30    


 


PT  10.9 SECONDS (9.4-12.5)   12/03/17  10:30    


 


INR  0.99  (0.93-1.08)   12/03/17  10:30    


 


APTT  32.7 Seconds (25.1-36.5)   12/03/17  10:30    


 


Sodium  139 mmol/L (132-148)   12/04/17  06:30    


 


Potassium  4.4 mmol/L (3.6-5.0)   12/04/17  06:30    


 


Chloride  106 mmol/L ()   12/04/17  06:30    


 


Carbon Dioxide  22 mmol/L (21-33)   12/04/17  06:30    


 


Anion Gap  15  (10-20)   12/04/17  06:30    


 


BUN  17 mg/dL (7-21)   12/04/17  06:30    


 


Creatinine  0.9 mg/dl (0.7-1.2)   12/04/17  06:30    


 


Est GFR ( Amer)  > 60   12/04/17  06:30    


 


Est GFR (Non-Af Amer)  > 60   12/04/17  06:30    


 


Random Glucose  240 mg/dL ()  H  12/04/17  06:30    


 


Hemoglobin A1c  7.4 % (4.2-6.5)  H  12/04/17  06:30    


 


Calcium  9.8 mg/dL (8.4-10.5)   12/04/17  06:30    


 


Magnesium  1.6 mg/dL (1.7-2.2)  L  12/03/17  10:30    


 


Total Bilirubin  0.7 mg/dL (0.2-1.3)   12/03/17  10:30    


 


AST  36 U/L (14-36)   12/03/17  10:30    


 


ALT  46 U/L (7-56)   12/03/17  10:30    


 


Alkaline Phosphatase  115 U/L ()   12/03/17  10:30    


 


Lactate Dehydrogenase  527 U/L (333-699)   12/03/17  10:30    


 


Total Creatine Kinase  73 U/L ()   12/03/17  10:30    


 


Troponin I  < 0.01 ng/mL  12/03/17  10:30    


 


NT-Pro-B Natriuret Pep  185 pg/mL (0-450)   12/03/17  10:30    


 


Total Protein  7.3 g/dL (5.8-8.3)   12/03/17  10:30    


 


Albumin  3.9 g/dL (3.0-4.8)   12/03/17  10:30    


 


Globulin  3.4 gm/dL  12/03/17  10:30    


 


Albumin/Globulin Ratio  1.1  (1.1-1.8)   12/03/17  10:30    


 


Triglycerides  76 mg/dL ()   12/04/17  06:30    


 


Cholesterol  217 mg/dL (130-200)  H  12/04/17  06:30    


 


LDL Cholesterol Direct  131 mg/dL (0-129)  H  12/04/17  06:30    


 


HDL Cholesterol  50 mg/dL (29-60)   12/04/17  06:30    


 


TSH 3rd Generation  0.10 mIU/mL (0.46-4.68)  L  12/04/17  06:30    


 


Urine Color  Yellow  (YELLOW)   12/03/17  11:40    


 


Urine Appearance  Sl cloudy  (CLEAR)   12/03/17  11:40    


 


Urine pH  7.5  (4.7-8.0)   12/03/17  11:40    


 


Ur Specific Gravity  1.015  (1.005-1.035)   12/03/17  11:40    


 


Urine Protein  Trace mg/dL (<30 mg/dL)  H  12/03/17  11:40    


 


Urine Glucose (UA)  Negative mg/dL (NEGATIVE)   12/03/17  11:40    


 


Urine Ketones  Negative mg/dL (NEGATIVE)   12/03/17  11:40    


 


Urine Blood  Small  (NEGATIVE)  H  12/03/17  11:40    


 


Urine Nitrate  Negative  (NEGATIVE)   12/03/17  11:40    


 


Urine Bilirubin  Negative  (NEGATIVE)   12/03/17  11:40    


 


Urine Urobilinogen  0.2 E.U./dL (<1 E.U./dL)   12/03/17  11:40    


 


Ur Leukocyte Esterase  Negative Chanel/uL (NEGATIVE)   12/03/17  11:40    


 


Urine RBC  0 - 2 /hpf (0-2)   12/03/17  11:40    


 


Urine WBC  Negative /hpf (0-6)   12/03/17  11:40    


 


Ur Epithelial Cells  1 - 3 /hpf (0-5)   12/03/17  11:40    


 


Urine Bacteria  Trace  (NEG)   12/03/17  11:40    


 


Influenza Typ A,B (EIA)  Negative for flu a/b  (NEGATIVE)   12/03/17  10:30    














- Hospital Course


Hospital Course: 


67 yr [place] female w/ asthma, HTN, GERD. She is laying in bed comfortably c/

o increasing cough, nonproductive. 





Reviewed:


ECG = (+) NSR, nonspecific T wave abnormality, abn.


CXR = (-) WNL





Pt cleared for discharge home. 








Discharge Exam





- Head Exam


Head Exam: ATRAUMATIC, NORMAL INSPECTION, NORMOCEPHALIC





- Eye Exam


Eye Exam: Normal appearance


Pupil Exam: NORMAL ACCOMODATION





- ENT Exam


ENT Exam: Mucous Membranes Moist





- Neck Exam


Neck exam: Full Rom





- Respiratory Exam


Respiratory Exam: Clear to PA & Lateral, NORMAL BREATHING PATTERN





- Cardiovascular Exam


Cardiovascular Exam: REGULAR RHYTHM, +S1, +S2





- GI/Abdominal Exam


GI & Abdominal Exam: Normal Bowel Sounds, Soft





- Back Exam


Back exam: NORMAL INSPECTION





- Neurological Exam


Neurological exam: Alert, CN II-XII Intact, Normal Gait, Oriented x3, Reflexes 

Normal





- Psychiatric Exam


Psychiatric exam: Normal Affect, Normal Mood





- Skin


Skin Exam: Dry, Intact, Normal Color, Warm





Discharge Plan





- Discharge Medications


Prescriptions: 


Albuterol/Ipratropium [Duoneb 3 mg/0.5 mg (3 ml) UD] 3 ml IH [id number] PRN 30 

Days #1 neb


 PRN Reason: Shortness Of Breath


Albuterol HFA [Ventolin HFA 90 mcg/actuation (8 g)] 2 puff INH PRN PRN #1 

inhaler


 PRN Reason: Wheezing


Azithromycin 250 mg PO DAILY #6 tablet


Budesonide [Pulmicort Respules] 0.25 mg IH [id number] #1 nebu


Famotidine [Pepcid] 40 mg PO DAILY PRN 30 Days  tab


 PRN Reason: Dyspepsia


methylPREDNISolone [Medrol] 4 mg PO DAILY #21 tab


Metoclopramide [Reglan] 5 mg PO 0600,1130,1630,2200 30 Days  tab


Pregabalin [Lyrica] 25 mg PO BID #60 cap





- Follow Up Plan


Condition: STABLE


Disposition: HOME/ ROUTINE


Instructions:  Asthma (DC), Asthma (GEN), Hypokalemia (DC), Hypokalemia (GEN), 

Cigarette Smoking and Your Health (GEN), Acute Bronchitis (GEN), 

Gastroesophageal Reflux Disease (GEN)


Additional Instructions: 


Please follow up with Dr. Lee @11am on 12/8/17





Please  medication at front lobby.


Referrals: 


Vidal Gurrola MD [Primary Care Provider] - 





<Mayra Lee - Last Filed: 12/06/17 22:59>





Provider





- Provider


Date of Admission: 


12/03/17 12:21





Attending physician: 


Mayra Lee MD





Primary care physician: 


Vidal Gurrola MD








Hospital Course





- Lab Results


Lab Results: 


 Most Recent Lab Values











WBC  6.9 10^3/ul (4.5-11.0)   12/04/17  06:30    


 


RBC  4.99 10^6/uL (3.5-6.1)   12/04/17  06:30    


 


Hgb  12.9 g/dL (12.0-16.0)   12/04/17  06:30    


 


Hct  39.5 % (36.0-48.0)   12/04/17  06:30    


 


MCV  79.2 fl (80.0-105.0)  L  12/04/17  06:30    


 


MCH  25.9 pg (25.0-35.0)   12/04/17  06:30    


 


MCHC  32.7 g/dl (31.0-37.0)   12/04/17  06:30    


 


RDW  15.9 % (11.5-14.5)  H  12/04/17  06:30    


 


Plt Count  295 10^3/uL (120.0-450.0)   12/04/17  06:30    


 


MPV  10.6 fl (7.0-11.0)   12/04/17  06:30    


 


Gran %  58.9 % (50.0-68.0)   12/03/17  10:30    


 


Lymph % (Auto)  28.7 % (22.0-35.0)   12/03/17  10:30    


 


Mono % (Auto)  11.9 % (1.0-6.0)  H  12/03/17  10:30    


 


Eos % (Auto)  0.1 % (1.5-5.0)  L  12/03/17  10:30    


 


Baso % (Auto)  0.4 % (0.0-3.0)   12/03/17  10:30    


 


Gran #  5.00  (1.4-6.5)   12/03/17  10:30    


 


Lymph #  2.4  (1.2-3.4)   12/03/17  10:30    


 


Mono #  1.0  (0.1-0.6)  H  12/03/17  10:30    


 


Eos #  0.0  (0.0-0.7)   12/03/17  10:30    


 


Baso #  0.03 K/mm3 (0.0-2.0)   12/03/17  10:30    


 


PT  10.9 SECONDS (9.4-12.5)   12/03/17  10:30    


 


INR  0.99  (0.93-1.08)   12/03/17  10:30    


 


APTT  32.7 Seconds (25.1-36.5)   12/03/17  10:30    


 


Sodium  139 mmol/L (132-148)   12/04/17  06:30    


 


Potassium  4.4 mmol/L (3.6-5.0)   12/04/17  06:30    


 


Chloride  106 mmol/L ()   12/04/17  06:30    


 


Carbon Dioxide  22 mmol/L (21-33)   12/04/17  06:30    


 


Anion Gap  15  (10-20)   12/04/17  06:30    


 


BUN  17 mg/dL (7-21)   12/04/17  06:30    


 


Creatinine  0.9 mg/dl (0.7-1.2)   12/04/17  06:30    


 


Est GFR ( Amer)  > 60   12/04/17  06:30    


 


Est GFR (Non-Af Amer)  > 60   12/04/17  06:30    


 


Random Glucose  240 mg/dL ()  H  12/04/17  06:30    


 


Hemoglobin A1c  7.4 % (4.2-6.5)  H  12/04/17  06:30    


 


Calcium  9.8 mg/dL (8.4-10.5)   12/04/17  06:30    


 


Magnesium  1.6 mg/dL (1.7-2.2)  L  12/03/17  10:30    


 


Total Bilirubin  0.7 mg/dL (0.2-1.3)   12/03/17  10:30    


 


AST  36 U/L (14-36)   12/03/17  10:30    


 


ALT  46 U/L (7-56)   12/03/17  10:30    


 


Alkaline Phosphatase  115 U/L ()   12/03/17  10:30    


 


Lactate Dehydrogenase  527 U/L (333-699)   12/03/17  10:30    


 


Total Creatine Kinase  73 U/L ()   12/03/17  10:30    


 


Troponin I  < 0.01 ng/mL  12/03/17  10:30    


 


NT-Pro-B Natriuret Pep  185 pg/mL (0-450)   12/03/17  10:30    


 


Total Protein  7.3 g/dL (5.8-8.3)   12/03/17  10:30    


 


Albumin  3.9 g/dL (3.0-4.8)   12/03/17  10:30    


 


Globulin  3.4 gm/dL  12/03/17  10:30    


 


Albumin/Globulin Ratio  1.1  (1.1-1.8)   12/03/17  10:30    


 


Triglycerides  76 mg/dL ()   12/04/17  06:30    


 


Cholesterol  217 mg/dL (130-200)  H  12/04/17  06:30    


 


LDL Cholesterol Direct  131 mg/dL (0-129)  H  12/04/17  06:30    


 


HDL Cholesterol  50 mg/dL (29-60)   12/04/17  06:30    


 


TSH 3rd Generation  0.10 mIU/mL (0.46-4.68)  L  12/04/17  06:30    


 


Urine Color  Yellow  (YELLOW)   12/03/17  11:40    


 


Urine Appearance  Sl cloudy  (CLEAR)   12/03/17  11:40    


 


Urine pH  7.5  (4.7-8.0)   12/03/17  11:40    


 


Ur Specific Gravity  1.015  (1.005-1.035)   12/03/17  11:40    


 


Urine Protein  Trace mg/dL (<30 mg/dL)  H  12/03/17  11:40    


 


Urine Glucose (UA)  Negative mg/dL (NEGATIVE)   12/03/17  11:40    


 


Urine Ketones  Negative mg/dL (NEGATIVE)   12/03/17  11:40    


 


Urine Blood  Small  (NEGATIVE)  H  12/03/17  11:40    


 


Urine Nitrate  Negative  (NEGATIVE)   12/03/17  11:40    


 


Urine Bilirubin  Negative  (NEGATIVE)   12/03/17  11:40    


 


Urine Urobilinogen  0.2 E.U./dL (<1 E.U./dL)   12/03/17  11:40    


 


Ur Leukocyte Esterase  Negative Chanel/uL (NEGATIVE)   12/03/17  11:40    


 


Urine RBC  0 - 2 /hpf (0-2)   12/03/17  11:40    


 


Urine WBC  Negative /hpf (0-6)   12/03/17  11:40    


 


Ur Epithelial Cells  1 - 3 /hpf (0-5)   12/03/17  11:40    


 


Urine Bacteria  Trace  (NEG)   12/03/17  11:40    


 


Influenza Typ A,B (EIA)  Negative for flu a/b  (NEGATIVE)   12/03/17  10:30    














- Hospital Course


Hospital Course: 





pt is seen and examined at bed side , looking comfortable , agreed all above . 

dc home today with meds at bed side

## 2017-12-06 NOTE — PN
DATE:



SUBJECTIVE:  The patient was examined at the bedside and looking

comfortable.  No fever, no chills.  Shortness of breath better.  Cough is

better.  No hematuria or hematochezia.  No nausea, vomiting, diarrhea.



PHYSICAL EXAMINATION:

VITAL SIGNS:   Temperature 98.3, pulse 67, blood pressure 165/94,

respiratory rate 20.

HEENT:  Head is normocephalic and atraumatic.  Eyes; PERRLA.  Extraocular

muscles intact.  Conjunctivae clear.  Nose patent.  Mucous membranes moist.

NECK:   Supple.  No carotid bruits.  No JVD.  No thyromegaly.

CHEST:  Bilaterally symmetrical.

HEART:  S1 and S2 positive.

LUNGS:  Clear to auscultation.

ABDOMEN:  Soft.  Bowel sounds present.  No organomegaly.

EXTREMITIES:   No edema.  No cyanosis.

NEUROLOGICAL:  The patient is awake, alert.  Moving all four extremities. 

No focal deficits.



MEDICATIONS:  Albuterol, Lyrica, Pepcid, Protonix, Pulmicort, Reglan,

Robitussin, Rocephin, Solu-Medrol, Tylenol.



LABORATORY DATA:  We do not have recent labs today, but I reviewed old

labs.



ASSESSMENT AND PLAN:  Ms. Rafal Chavis is a 67-year-old lady with

hypercholesterolemia, hyperglycemia, hemoglobin A1c 7.4, diabetes mellitus,

influenza type A and B is negative, gastroesophageal reflux disease,

dyspepsia, recurrent coughing, and shortness of breath probably related to

severe gastroesophageal reflux disease, acute bronchitis/asthma, maybe

component of sleep apnea syndrome.  We will continue IV inhaled

bronchodilators.  Continue antibiotics.  Continue Reglan, proton pump

inhibitors.  Gastrointestinal and deep venous thrombosis prophylaxis. 

Discussion done with the patient and nursing staff.  Need pulmonary

function study and sleep study as outpatient.  We will follow.





__________________________________________

Mayra Lee MD



DD:  12/05/2017 19:37:33

DT:  12/05/2017 20:02:48

Job # [id number]



GLENN

## 2018-03-19 ENCOUNTER — HOSPITAL ENCOUNTER (OUTPATIENT)
Dept: HOSPITAL 31 - C.ENDO | Age: 68
Discharge: HOME | End: 2018-03-19
Attending: INTERNAL MEDICINE
Payer: MEDICARE

## 2018-03-19 VITALS — BODY MASS INDEX: 37.7 KG/M2

## 2018-03-19 VITALS — DIASTOLIC BLOOD PRESSURE: 71 MMHG | HEART RATE: 70 BPM | SYSTOLIC BLOOD PRESSURE: 114 MMHG

## 2018-03-19 VITALS — TEMPERATURE: 97.5 F

## 2018-03-19 VITALS — RESPIRATION RATE: 18 BRPM

## 2018-03-19 VITALS — OXYGEN SATURATION: 100 %

## 2018-03-19 DIAGNOSIS — D12.5: ICD-10-CM

## 2018-03-19 DIAGNOSIS — K21.9: ICD-10-CM

## 2018-03-19 DIAGNOSIS — K29.70: ICD-10-CM

## 2018-03-19 DIAGNOSIS — D12.3: Primary | ICD-10-CM

## 2018-03-19 PROCEDURE — 43239 EGD BIOPSY SINGLE/MULTIPLE: CPT

## 2018-03-19 PROCEDURE — 88305 TISSUE EXAM BY PATHOLOGIST: CPT

## 2018-03-19 PROCEDURE — 82948 REAGENT STRIP/BLOOD GLUCOSE: CPT

## 2018-03-19 PROCEDURE — 88342 IMHCHEM/IMCYTCHM 1ST ANTB: CPT

## 2018-03-19 PROCEDURE — 45385 COLONOSCOPY W/LESION REMOVAL: CPT

## 2018-04-03 ENCOUNTER — HOSPITAL ENCOUNTER (EMERGENCY)
Dept: HOSPITAL 42 - ED | Age: 68
Discharge: HOME | End: 2018-04-03
Payer: MEDICARE

## 2018-04-03 VITALS — HEART RATE: 68 BPM | SYSTOLIC BLOOD PRESSURE: 148 MMHG | DIASTOLIC BLOOD PRESSURE: 79 MMHG

## 2018-04-03 VITALS — RESPIRATION RATE: 18 BRPM | TEMPERATURE: 97.9 F | OXYGEN SATURATION: 95 %

## 2018-04-03 VITALS — BODY MASS INDEX: 37.7 KG/M2

## 2018-04-03 DIAGNOSIS — E11.9: ICD-10-CM

## 2018-04-03 DIAGNOSIS — J45.909: ICD-10-CM

## 2018-04-03 DIAGNOSIS — J06.9: Primary | ICD-10-CM

## 2018-04-03 DIAGNOSIS — I10: ICD-10-CM

## 2018-04-03 LAB
ALBUMIN SERPL-MCNC: 4.3 G/DL (ref 3–4.8)
ALBUMIN/GLOB SERPL: 1.1 {RATIO} (ref 1.1–1.8)
ALT SERPL-CCNC: 58 U/L (ref 7–56)
AST SERPL-CCNC: 44 U/L (ref 14–36)
BASOPHILS # BLD AUTO: 0.03 K/MM3 (ref 0–2)
BASOPHILS NFR BLD: 0.5 % (ref 0–3)
BUN SERPL-MCNC: 14 MG/DL (ref 7–21)
CALCIUM SERPL-MCNC: 10.4 MG/DL (ref 8.4–10.5)
EOSINOPHIL # BLD: 0.1 10*3/UL (ref 0–0.7)
EOSINOPHIL NFR BLD: 1.1 % (ref 1.5–5)
ERYTHROCYTE [DISTWIDTH] IN BLOOD BY AUTOMATED COUNT: 14 % (ref 11.5–14.5)
GFR NON-AFRICAN AMERICAN: 56
GRANULOCYTES # BLD: 3.06 10*3/UL (ref 1.4–6.5)
GRANULOCYTES NFR BLD: 46.2 % (ref 50–68)
HGB BLD-MCNC: 13.7 G/DL (ref 12–16)
LYMPHOCYTES # BLD: 2.7 10*3/UL (ref 1.2–3.4)
LYMPHOCYTES NFR BLD AUTO: 40.2 % (ref 22–35)
MCH RBC QN AUTO: 26 PG (ref 25–35)
MCHC RBC AUTO-ENTMCNC: 33 G/DL (ref 31–37)
MCV RBC AUTO: 78.7 FL (ref 80–105)
MONOCYTES # BLD AUTO: 0.8 10*3/UL (ref 0.1–0.6)
MONOCYTES NFR BLD: 12 % (ref 1–6)
PLATELET # BLD: 242 10^3/UL (ref 120–450)
PMV BLD AUTO: 11 FL (ref 7–11)
RBC # BLD AUTO: 5.27 10^6/UL (ref 3.5–6.1)
TROPONIN I SERPL-MCNC: < 0.01 NG/ML
WBC # BLD AUTO: 6.6 10^3/UL (ref 4.5–11)

## 2018-04-03 NOTE — RAD
HISTORY:

cough  



COMPARISON:

12/03/2017 



FINDINGS:



LUNGS:

No active pulmonary disease.



PLEURA:

No significant pleural effusion identified, no pneumothorax apparent.



CARDIOVASCULAR:

Normal.



OSSEOUS STRUCTURES:

No significant abnormalities.



VISUALIZED UPPER ABDOMEN:

Normal.



OTHER FINDINGS:

None.



IMPRESSION:

No active disease.

## 2018-04-03 NOTE — ED PDOC
Arrival/HPI





- General


Chief Complaint: Shortness Of Breath


Time Seen by Provider: 04/03/18 12:47


Historian: Patient





- History of Present Illness


Narrative History of Present Illness (Text): 


04/03/18 13:02


A 67 year old female, whose past medical history includes hypertension and 

asthma, presents to the emergency department complaining of productive cough. 

Patient reports productive cough of brown sputum. Patient reports wheezing, 

shortness of breath, feeling feverish and chills. Patient is not a smoker or 

drinker. Patient denies any chest pain or any other complaints at this time.





Symptom Onset: Sudden


Symptom Course: Unchanged


Activities at Onset: Rest


Context: Home


Associated Symptoms (Text): 





04/03/18 13:27


Several day history of a cough productive of brown sputum with congestion 

shortness of breath feeling feverish and chills fatigue and wheezing.





Past Medical History





- Provider Review


Nursing Documentation Reviewed: Yes





- Infectious Disease


Hx of Infectious Diseases: None





- Cardiac


Hx Cardiac Disorders: Yes


Hx Hypertension: Yes





- Pulmonary


Hx Respiratory Disorders: Yes


Hx Asthma: Yes


Hx Bronchitis: Yes





- Neurological


Hx Neurological Disorder: No





- HEENT


Hx HEENT Disorder: No





- Renal


Hx Renal Disorder: No





- Endocrine/Metabolic


Hx Endocrine Disorders: Yes


Hx Diabetes Mellitus Type 2: Yes





- Hematological/Oncological


Hx Blood Disorders: No


Other/Comment: low iron





- Integumentary


Hx Dermatological Disorder: No





- Musculoskeletal/Rheumatological


Hx Musculoskeletal Disorders: Yes


Hx Arthritis: Yes


Hx Falls: No





- Gastrointestinal


Hx Gastrointestinal Disorders: Yes


Hx Gastroesophageal Reflux: Yes





- Genitourinary/Gynecological


Hx Genitourinary Disorders: No





- Psychiatric


Hx Psychophysiologic Disorder: No


Hx Substance Use: No





- Surgical History


Hx Cataract Extraction: Yes





- Anesthesia


Hx Anesthesia: Yes


Hx Anesthesia Reactions: No


Hx Malignant Hyperthermia: No





- Suicidal Assessment


Feels Threatened In Home Enviroment: No





Family/Social History





- Physician Review


Nursing Documentation Reviewed: Yes


Family/Social History: No Known Family HX


Smoking Status: Never Smoked


Hx Alcohol Use: No


Hx Substance Use: No





Allergies/Home Meds


Allergies/Adverse Reactions: 


Allergies





No Known Allergies Allergy (Verified 04/03/18 12:41)


 








Home Medications: 


 Home Meds











 Medication  Instructions  Recorded  Confirmed


 


amLODIPine [Norvasc] 10 mg PO DAILY 12/03/17 04/03/18


 


Metformin HCl [Metformin HCl ER] 500 mg PO TID 03/19/18 04/03/18


 


Pantoprazole Sodium [Protonix] 40 mg PO BID 03/19/18 04/03/18














Review of Systems





- Physician Review


All systems were reviewed & negative as marked: Yes





- Review of Systems


Constitutional: Fatigue, Fevers (feeling feverish), Other (chills)


Respiratory: SOB, Cough (productive), Sputum, Wheezing


Cardiovascular: absent: Chest Pain


Gastrointestinal: absent: Abdominal Pain, Nausea, Vomiting


Neurological: absent: Headache, Dizziness, Focal Weakness





Physical Exam


Vital Signs Reviewed: Yes


Vital Signs











  Temp Pulse Resp BP Pulse Ox


 


 04/03/18 12:50  97.9 F  70  18  151/89 H  95


 


 04/03/18 12:44    18  











Temperature: Afebrile


Blood Pressure: Hypertensive


Pulse: Regular


Respiratory Rate: Normal


Appearance: Positive for: Well-Appearing, Non-Toxic, Uncomfortable


Pain Distress: None


Mental Status: Positive for: Alert and Oriented X 3





- Systems Exam


Head: Present: Atraumatic, Normocephalic


Pupils: Present: PERRL


Extroacular Muscles: Present: EOMI


Conjunctiva: Present: Normal


Ears: Present: NORMAL TM, Normal Canal.  No: Erythema


Mouth: Present: Moist Mucous Membranes


Pharnyx: No: ERYTHEMA, EXUDATE, TONSILS ENLARGED


Neck: Present: Normal Range of Motion


Respiratory/Chest: Present: Wheezes (mild).  No: Respiratory Distress, 

Accessory Muscle Use, Rales, Retracting, Rhonchi, Tachypneic, Tender to 

Palpation


Cardiovascular: Present: Regular Rate and Rhythm, Normal S1, S2.  No: Murmurs


Abdomen: No: Tenderness, Distention, Peritoneal Signs


Back: Present: Normal Inspection


Upper Extremity: Present: Normal Inspection.  No: Cyanosis, Edema


Lower Extremity: Present: Normal Inspection.  No: Edema


Neurological: Present: GCS=15, CN II-XII Intact, Speech Normal, Motor Func 

Grossly Intact


Skin: Present: Warm, Dry, Normal Color.  No: Rashes


Psychiatric: Present: Alert, Oriented x 3, Normal Insight, Normal Concentration





Medical Decision Making


ED Course and Treatment: 


04/03/18 13:00


Impression:


A 67 year old female with productive cough, shortness of breath and wheezing. 





Plan:


-- EKG


-- Chest X-ray


-- labs


-- Duoneb


-- Reassess and disposition





Prior Visits:


Notes and results from previous visits were reviewed. Patient was last seen in 

the emergency department on 12/3/17 for evaluation of shortness of breath and 

cough.





Progress Notes:








04/03/18 13:28


EKG shows normal sinus with nonspecific T-wave changes and no acute ST or T-

wave changes


04/03/18 15:07


Work up is unremarkable. Patient does not appear to be in any distress. 

Discharged home accompanied by daughter to follow up with PMD. Follow-up in the 

ER as needed.





- Lab Interpretations


Lab Results: 








 04/03/18 13:40 





 04/03/18 14:28 





 Lab Results





04/03/18 14:28: Sodium 139, Potassium 3.7, Chloride 103, Carbon Dioxide 24, 

Anion Gap 16, BUN 14, Creatinine 1.0, Est GFR (African Amer) > 60, Est GFR (Non-

Af Amer) 56, Random Glucose 103, Calcium 10.4, Total Bilirubin 0.3, AST 44 H D, 

ALT 58 H, Alkaline Phosphatase 169 H D, Lactate Dehydrogenase 488, Total 

Creatine Kinase 79, Troponin I < 0.01, Total Protein 8.0, Albumin 4.3, Globulin 

3.7, Albumin/Globulin Ratio 1.1


04/03/18 13:40: Influenza Typ A,B (EIA) Negative for flu a/b


04/03/18 13:40: WBC 6.6, RBC 5.27, Hgb 13.7, Hct 41.5, MCV 78.7 L, MCH 26.0, 

MCHC 33.0, RDW 14.0, Plt Count 242, MPV 11.0, Gran % 46.2 L, Lymph % (Auto) 

40.2 H, Mono % (Auto) 12.0 H, Eos % (Auto) 1.1 L, Baso % (Auto) 0.5, Gran # 3.06

, Lymph # (Auto) 2.7, Mono # (Auto) 0.8 H, Eos # (Auto) 0.1, Baso # (Auto) 0.03








I have reviewed the lab results: Yes





- RAD Interpretation


Radiology Orders: 








04/03/18 12:55


CHEST PORTABLE [RAD] Stat 








Chest 1 view is read by the radiologist shows no infiltrate effusion or 

cardiomegaly.


: Radiologist





- EKG Interpretation


Interpreted by ED Physician: Yes


Type: 12 lead EKG





- Medication Orders


Current Medication Orders: 











Discontinued Medications





Albuterol/Ipratropium (Duoneb 3 Mg/0.5 Mg (3 Ml) Ud)  3 ml IH ONCE STA


   Stop: 04/03/18 12:57


   Last Admin: 04/03/18 13:43  Dose: 3 ml











- Scribe Statement


The provider has reviewed the documentation as recorded by the Souravibe


Mir Lane





Provider Scribe Attestation:


All medical record entries made by the Scribe were at my direction and 

personally dictated by me. I have reviewed the chart and agree that the record 

accurately reflects my personal performance of the history, physical exam, 

medical decision making, and the department course for this patient. I have 

also personally directed, reviewed, and agree with the discharge instructions 

and disposition.











Disposition/Present on Arrival





- Present on Arrival


Any Indicators Present on Arrival: No


History of DVT/PE: No


History of Uncontrolled Diabetes: Yes


Urinary Catheter: No


History of Decub. Ulcer: No


History Surgical Site Infection Following: None





- Disposition


Have Diagnosis and Disposition been Completed?: Yes


Diagnosis: 


 Upper respiratory infection, Asthmatic bronchitis





Disposition: HOME/ ROUTINE


Disposition Time: 15:07


Patient Plan: Discharge


Condition: GOOD


Discharge Instructions (ExitCare):  Asthma in Adults, Inhalers, Acute Bronchitis


Additional Instructions: 


Symptomatic treatment. Follow-up with PMD. Follow-up in the ER as needed.


Prescriptions: 


Amoxicillin/Clavulanate [Augmentin 875 MG-125 MG] 1 tab PO Q12 #20 tab


Prednisone [Deltasone] 20 mg PO DAILY #5 tablet


Benzonatate [Tessalon Perles] 100 mg PO Q8 #30 sgl


Albuterol HFA [Ventolin HFA 90 mcg/actuation (8 g)] 2 puff IH [id number] #1 puff


Referrals: 


Mayra Lee MD [Primary Care Provider] - Follow up with primary


Forms:  [employer] (English)

## 2018-04-03 NOTE — CARD
--------------- APPROVED REPORT --------------





EKG Measurement

Heart [id number]

[id number]

[id number]

[id number]

[id number]



<Conclusion>

Normal sinus rhythm

Left axis deviation

ST & T wave abnormality, consider anterior ischemia

Abnormal ECG